# Patient Record
Sex: FEMALE | Race: WHITE | Employment: PART TIME | URBAN - METROPOLITAN AREA
[De-identification: names, ages, dates, MRNs, and addresses within clinical notes are randomized per-mention and may not be internally consistent; named-entity substitution may affect disease eponyms.]

---

## 2021-10-21 PROBLEM — G91.9 HYDROCEPHALUS (HCC): Status: ACTIVE | Noted: 2021-10-21

## 2021-10-21 PROBLEM — R42 DIZZINESS: Status: ACTIVE | Noted: 2021-10-21

## 2021-10-21 PROBLEM — Z78.9 VEGETARIAN DIET: Status: ACTIVE | Noted: 2021-10-21

## 2021-10-21 PROBLEM — R19.7 DIARRHEA: Status: ACTIVE | Noted: 2021-10-21

## 2021-10-21 PROBLEM — R23.8 SCALP IRRITATION: Status: ACTIVE | Noted: 2021-10-21

## 2022-02-07 ENCOUNTER — HOSPITAL ENCOUNTER (OUTPATIENT)
Dept: CT IMAGING | Age: 32
Discharge: HOME | End: 2022-02-07
Attending: PSYCHIATRY & NEUROLOGY
Payer: COMMERCIAL

## 2022-02-07 DIAGNOSIS — I67.1 BRAIN ANEURYSM: ICD-10-CM

## 2022-02-07 PROCEDURE — 70496 CT ANGIOGRAPHY HEAD: CPT

## 2022-02-07 PROCEDURE — 74011000636 HC RX REV CODE- 636: Performed by: PSYCHIATRY & NEUROLOGY

## 2022-02-07 PROCEDURE — 74011000258 HC RX REV CODE- 258: Performed by: PSYCHIATRY & NEUROLOGY

## 2022-02-07 RX ORDER — SODIUM CHLORIDE 0.9 % (FLUSH) 0.9 %
10 SYRINGE (ML) INJECTION
Status: COMPLETED | OUTPATIENT
Start: 2022-02-07 | End: 2022-02-07

## 2022-02-07 RX ADMIN — SODIUM CHLORIDE 100 ML: 9 INJECTION, SOLUTION INTRAVENOUS at 10:34

## 2022-02-07 RX ADMIN — IOPAMIDOL 100 ML: 755 INJECTION, SOLUTION INTRAVENOUS at 10:34

## 2022-02-07 RX ADMIN — Medication 10 ML: at 10:34

## 2022-02-08 ENCOUNTER — HOSPITAL ENCOUNTER (OUTPATIENT)
Dept: MRI IMAGING | Age: 32
Discharge: HOME | End: 2022-02-08
Attending: PSYCHIATRY & NEUROLOGY
Payer: COMMERCIAL

## 2022-02-08 DIAGNOSIS — G37.9 DEMYELINATING DISEASE (HCC): ICD-10-CM

## 2022-02-08 PROCEDURE — A9576 INJ PROHANCE MULTIPACK: HCPCS | Performed by: PSYCHIATRY & NEUROLOGY

## 2022-02-08 PROCEDURE — 74011250636 HC RX REV CODE- 250/636: Performed by: PSYCHIATRY & NEUROLOGY

## 2022-02-08 PROCEDURE — 70553 MRI BRAIN STEM W/O & W/DYE: CPT

## 2022-02-08 RX ORDER — SODIUM CHLORIDE 0.9 % (FLUSH) 0.9 %
10 SYRINGE (ML) INJECTION
Status: COMPLETED | OUTPATIENT
Start: 2022-02-08 | End: 2022-02-08

## 2022-02-08 RX ADMIN — Medication 10 ML: at 12:42

## 2022-02-08 RX ADMIN — GADOTERIDOL 13 ML: 279.3 INJECTION, SOLUTION INTRAVENOUS at 12:42

## 2022-03-18 PROBLEM — R42 DIZZINESS: Status: ACTIVE | Noted: 2021-10-21

## 2022-03-19 PROBLEM — G91.9 HYDROCEPHALUS (HCC): Status: ACTIVE | Noted: 2021-10-21

## 2022-03-19 PROBLEM — Z78.9 VEGETARIAN DIET: Status: ACTIVE | Noted: 2021-10-21

## 2022-03-19 PROBLEM — R19.7 DIARRHEA: Status: ACTIVE | Noted: 2021-10-21

## 2022-03-20 PROBLEM — R23.8 SCALP IRRITATION: Status: ACTIVE | Noted: 2021-10-21

## 2022-05-26 ENCOUNTER — APPOINTMENT (RX ONLY)
Dept: URBAN - METROPOLITAN AREA CLINIC 329 | Facility: CLINIC | Age: 32
Setting detail: DERMATOLOGY
End: 2022-05-26

## 2022-05-26 DIAGNOSIS — D22 MELANOCYTIC NEVI: ICD-10-CM

## 2022-05-26 DIAGNOSIS — L30.4 ERYTHEMA INTERTRIGO: ICD-10-CM | Status: STABLE

## 2022-05-26 PROBLEM — D48.5 NEOPLASM OF UNCERTAIN BEHAVIOR OF SKIN: Status: ACTIVE | Noted: 2022-05-26

## 2022-05-26 PROCEDURE — 99203 OFFICE O/P NEW LOW 30 MIN: CPT | Mod: 25

## 2022-05-26 PROCEDURE — ? COUNSELING

## 2022-05-26 PROCEDURE — 11302 SHAVE SKIN LESION 1.1-2.0 CM: CPT

## 2022-05-26 PROCEDURE — ? PRESCRIPTION

## 2022-05-26 PROCEDURE — ? SHAVE REMOVAL

## 2022-05-26 PROCEDURE — ? PRESCRIPTION MEDICATION MANAGEMENT

## 2022-05-26 RX ORDER — KETOCONAZOLE 20 MG/G
CREAM TOPICAL
Qty: 60 | Refills: 2 | Status: ERX | COMMUNITY
Start: 2022-05-26

## 2022-05-26 RX ADMIN — KETOCONAZOLE: 20 CREAM TOPICAL at 00:00

## 2022-05-26 ASSESSMENT — LOCATION ZONE DERM
LOCATION ZONE: TRUNK
LOCATION ZONE: AXILLAE
LOCATION ZONE: AXILLAE
LOCATION ZONE: LEG
LOCATION ZONE: TRUNK
LOCATION ZONE: LEG

## 2022-05-26 ASSESSMENT — LOCATION DETAILED DESCRIPTION DERM
LOCATION DETAILED: RIGHT AXILLARY VAULT
LOCATION DETAILED: LEFT SUPERIOR MEDIAL UPPER BACK
LOCATION DETAILED: LEFT SUPERIOR MEDIAL UPPER BACK
LOCATION DETAILED: LEFT AXILLARY VAULT
LOCATION DETAILED: RIGHT AXILLARY VAULT
LOCATION DETAILED: LEFT ANTERIOR PROXIMAL THIGH
LOCATION DETAILED: RIGHT ANTERIOR PROXIMAL THIGH
LOCATION DETAILED: LEFT RIB CAGE
LOCATION DETAILED: LEFT ANTERIOR PROXIMAL THIGH
LOCATION DETAILED: RIGHT ANTERIOR PROXIMAL THIGH
LOCATION DETAILED: RIGHT LATERAL BREAST 6-7:00 REGION
LOCATION DETAILED: LEFT AXILLARY VAULT

## 2022-05-26 ASSESSMENT — LOCATION SIMPLE DESCRIPTION DERM
LOCATION SIMPLE: RIGHT AXILLARY VAULT
LOCATION SIMPLE: LEFT AXILLARY VAULT
LOCATION SIMPLE: LEFT THIGH
LOCATION SIMPLE: LEFT AXILLARY VAULT
LOCATION SIMPLE: RIGHT THIGH
LOCATION SIMPLE: LEFT UPPER BACK
LOCATION SIMPLE: RIGHT THIGH
LOCATION SIMPLE: RIGHT BREAST
LOCATION SIMPLE: LEFT UPPER BACK
LOCATION SIMPLE: ABDOMEN
LOCATION SIMPLE: LEFT THIGH
LOCATION SIMPLE: RIGHT AXILLARY VAULT

## 2022-05-26 ASSESSMENT — BSA RASH: BSA RASH: 6

## 2022-05-26 ASSESSMENT — PAIN INTENSITY VAS: HOW INTENSE IS YOUR PAIN 0 BEING NO PAIN, 10 BEING THE MOST SEVERE PAIN POSSIBLE?: NO PAIN

## 2022-05-26 ASSESSMENT — SEVERITY ASSESSMENT: SEVERITY: MILD TO MODERATE

## 2022-05-26 NOTE — PROCEDURE: PRESCRIPTION MEDICATION MANAGEMENT
Detail Level: Zone
Initiate Treatment: Ketoconazole cream bid for three weeks \\nZeasorb AF powder as needed
Render In Strict Bullet Format?: No

## 2022-06-09 DIAGNOSIS — D22.9 NEVUS: Primary | ICD-10-CM

## 2022-06-09 DIAGNOSIS — Z86.19 HISTORY OF FUNGAL SKIN INFECTION: ICD-10-CM

## 2022-06-29 ENCOUNTER — TELEPHONE (OUTPATIENT)
Dept: FAMILY MEDICINE CLINIC | Facility: CLINIC | Age: 32
End: 2022-06-29

## 2022-06-29 NOTE — TELEPHONE ENCOUNTER
----- Message from Esperanza Rai sent at 6/29/2022 10:27 AM EDT -----  Subject: Message to Provider    QUESTIONS  Information for Provider? Pt would like to switch providers to Dr. Leroy Gabriel if Possible she would like to come in on Friday after 3 for   allergy testing.   ---------------------------------------------------------------------------  --------------  CALL BACK INFO  What is the best way for the office to contact you? OK to leave message on   voicemail  Preferred Call Back Phone Number? [phone]  ---------------------------------------------------------------------------  --------------  SCRIPT ANSWERS  Relationship to Patient?  Self

## 2023-02-24 ENCOUNTER — OFFICE VISIT (OUTPATIENT)
Dept: FAMILY MEDICINE CLINIC | Facility: CLINIC | Age: 33
End: 2023-02-24

## 2023-02-24 VITALS
OXYGEN SATURATION: 98 % | SYSTOLIC BLOOD PRESSURE: 82 MMHG | TEMPERATURE: 98.2 F | WEIGHT: 143 LBS | DIASTOLIC BLOOD PRESSURE: 52 MMHG | HEART RATE: 70 BPM | HEIGHT: 66 IN | BODY MASS INDEX: 22.98 KG/M2

## 2023-02-24 DIAGNOSIS — B37.9 CANDIDA INFECTION: ICD-10-CM

## 2023-02-24 DIAGNOSIS — H92.01 EAR PAIN, RIGHT: Primary | ICD-10-CM

## 2023-02-24 PROCEDURE — 99213 OFFICE O/P EST LOW 20 MIN: CPT | Performed by: FAMILY MEDICINE

## 2023-02-24 RX ORDER — AMOXICILLIN AND CLAVULANATE POTASSIUM 875; 125 MG/1; MG/1
1 TABLET, FILM COATED ORAL 2 TIMES DAILY
Qty: 20 TABLET | Refills: 0 | Status: SHIPPED | OUTPATIENT
Start: 2023-02-24 | End: 2023-03-06

## 2023-02-24 RX ORDER — FLUCONAZOLE 150 MG/1
150 TABLET ORAL ONCE
Qty: 1 TABLET | Refills: 0 | Status: SHIPPED | OUTPATIENT
Start: 2023-02-24 | End: 2023-02-24

## 2023-02-24 SDOH — ECONOMIC STABILITY: FOOD INSECURITY: WITHIN THE PAST 12 MONTHS, THE FOOD YOU BOUGHT JUST DIDN'T LAST AND YOU DIDN'T HAVE MONEY TO GET MORE.: NEVER TRUE

## 2023-02-24 SDOH — ECONOMIC STABILITY: INCOME INSECURITY: HOW HARD IS IT FOR YOU TO PAY FOR THE VERY BASICS LIKE FOOD, HOUSING, MEDICAL CARE, AND HEATING?: NOT HARD AT ALL

## 2023-02-24 SDOH — ECONOMIC STABILITY: HOUSING INSECURITY
IN THE LAST 12 MONTHS, WAS THERE A TIME WHEN YOU DID NOT HAVE A STEADY PLACE TO SLEEP OR SLEPT IN A SHELTER (INCLUDING NOW)?: NO

## 2023-02-24 SDOH — ECONOMIC STABILITY: FOOD INSECURITY: WITHIN THE PAST 12 MONTHS, YOU WORRIED THAT YOUR FOOD WOULD RUN OUT BEFORE YOU GOT MONEY TO BUY MORE.: NEVER TRUE

## 2023-02-24 ASSESSMENT — PATIENT HEALTH QUESTIONNAIRE - PHQ9
1. LITTLE INTEREST OR PLEASURE IN DOING THINGS: 0
SUM OF ALL RESPONSES TO PHQ QUESTIONS 1-9: 0
SUM OF ALL RESPONSES TO PHQ9 QUESTIONS 1 & 2: 0
2. FEELING DOWN, DEPRESSED OR HOPELESS: 0

## 2023-02-24 ASSESSMENT — ENCOUNTER SYMPTOMS
EYE PAIN: 1
COUGH: 0
SHORTNESS OF BREATH: 0

## 2023-02-24 NOTE — PROGRESS NOTES
Keyana Pope (: 1990) is a 29 y.o. female, established patient, here for evaluation of the following chief complaint(s):  Tinnitus (Right ear/ thumping 4 months )       ASSESSMENT/PLAN:  1. Ear pain, right  -     amoxicillin-clavulanate (AUGMENTIN) 875-125 MG per tablet; Take 1 tablet by mouth 2 times daily for 10 days, Disp-20 tablet, R-0Normal    Right ear is a little bit erythematous, will treat with Augmentin. If no improvement to her symptoms over the next 5 days will refer to ENT. Return if symptoms worsen or fail to improve. SUBJECTIVE/OBJECTIVE:  HPI  72-year-old female presents for right ear pain, ringing as well as thumping. She said she had something in her ear for the past 3 months off and on, none today. She is also had some ache in her ears and ringing as well. She said she used to get very frequent ear infections, had seen ENT in high school. Denies any problems with hearing, no fever, congestion, signs of infection. She does have seasonal allergies, is not currently taking anything. Review of Systems   Constitutional:  Negative for activity change, appetite change, fever and unexpected weight change. Eyes:  Positive for pain. Respiratory:  Negative for cough and shortness of breath. Cardiovascular:  Negative for chest pain and palpitations. Skin:  Negative for rash. Neurological:  Negative for dizziness and headaches. Psychiatric/Behavioral:  Negative for sleep disturbance. Vitals:    23 1031   BP: (!) 82/52   Pulse: 70   Temp: 98.2 °F (36.8 °C)   SpO2: 98%       Physical Exam  Vitals reviewed. Constitutional:       General: She is not in acute distress. Appearance: Normal appearance. She is not ill-appearing, toxic-appearing or diaphoretic. HENT:      Head: Normocephalic and atraumatic. Right Ear: There is no impacted cerumen. Tympanic membrane is erythematous.       Left Ear: Tympanic membrane, ear canal and external ear normal. There is no impacted cerumen. Nose: Nose normal. No congestion or rhinorrhea. Mouth/Throat:      Mouth: Mucous membranes are moist.      Pharynx: Oropharynx is clear. No oropharyngeal exudate or posterior oropharyngeal erythema. Eyes:      General:         Right eye: No discharge. Left eye: No discharge. Conjunctiva/sclera: Conjunctivae normal.      Pupils: Pupils are equal, round, and reactive to light. Cardiovascular:      Rate and Rhythm: Normal rate and regular rhythm. Heart sounds: Normal heart sounds. No murmur heard. No friction rub. No gallop. Pulmonary:      Effort: Pulmonary effort is normal. No respiratory distress. Breath sounds: Normal breath sounds. No wheezing, rhonchi or rales. Musculoskeletal:         General: No swelling. Normal range of motion. Cervical back: Normal range of motion. No rigidity. Lymphadenopathy:      Cervical: No cervical adenopathy. Skin:     General: Skin is warm. Findings: No rash. Neurological:      General: No focal deficit present. Mental Status: She is alert. Psychiatric:         Mood and Affect: Mood normal.          On this date, I spent 25 minutes reviewing previous notes, test results and face to face with the patient discussing the diagnosis and importance of compliance with the treatment plan as well as documenting on the day of the visit. An electronic signature was used to authenticate this note.   -- Sandra Lay MD

## 2023-03-06 ENCOUNTER — OFFICE VISIT (OUTPATIENT)
Dept: ORTHOPEDIC SURGERY | Age: 33
End: 2023-03-06
Payer: COMMERCIAL

## 2023-03-06 DIAGNOSIS — M19.079 ANKLE ARTHRITIS: ICD-10-CM

## 2023-03-06 DIAGNOSIS — G89.29 CHRONIC PAIN OF LEFT ANKLE: Primary | ICD-10-CM

## 2023-03-06 DIAGNOSIS — M25.572 CHRONIC PAIN OF LEFT ANKLE: Primary | ICD-10-CM

## 2023-03-06 DIAGNOSIS — T84.84XA PAINFUL ORTHOPAEDIC HARDWARE (HCC): ICD-10-CM

## 2023-03-06 PROCEDURE — 99214 OFFICE O/P EST MOD 30 MIN: CPT | Performed by: ORTHOPAEDIC SURGERY

## 2023-03-06 NOTE — PROGRESS NOTES
Name: Mickey Springer  YOB: 1990  Gender: female  MRN: [id number]    Summary:   Left posttraumatic ankle arthritis with hardware pain       CC: Ankle Pain (Left ankle pain will xray today )       HPI: Mickey Springer is a 29 y.o. female who presents with Ankle Pain (Left ankle pain will xray today )  . This patient presents the office today with progressively worsening left ankle pain and stiffness. She had a trimalleolar ankle fracture dislocation that was treated about 10 years ago in [facility]. History was obtained by Patient     ROS/Meds/PSH/PMH/FH/SH: I personally reviewed the patients standard intake form. Below are the pertinents    Tobacco:  reports that she has never smoked. She has never used smokeless tobacco.  Diabetes: None      Physical Examination:    Exam of the left foot and ankle shows well-healed surgical incisions. She has limited plantarflexion and dorsiflexion with good subtalar joint range of motion. She has tenderness to palpation over the lateral hardware as well as over the medial hardware. She has palpable pulses and intact sensation. Imaging:   I independently interpreted XR taken today  Left ankle XR: AP, Lateral, Oblique views     ICD-10-CM    1. Chronic pain of left ankle  M25.572 XR ANKLE LEFT (MIN 3 VIEWS)    G89.29       2. Painful orthopaedic hardware ([address]  T84.84XA       3.  Ankle arthritis  M19.079          Report: AP, lateral, oblique x-ray of the left ankle demonstrates retained hardware and moderate posttraumatic arthritis    Impression: Pain hardware and moderate posttraumatic arthritis   Kiara Hurtado III, MD           Assessment:   Left ankle hardware pain with moderate posttraumatic arthritis    Treatment Plan:   4 This is a chronic illness/condition with exacerbation and progression  Treatment at this time: Elective major surgery with procedural risk factors  Studies ordered: NO XR needed @ Next Visit    Weight-bearing status: WBAT        Return to work/work restrictions: none  No medications given    left Ankle arthroscopic  debridement and micro-fracture and removal of hardware    Outpatient - 1 hour,  scope equipment, arthroscopic picks, 2.7 mm 30 degree scope, arthroscopic graspers, 3.5 mm shaver, noninvasive ankle distractor, C arm, trauma screwdriver set    Anesthesia - Choice       Risks and benefits of OCD debridement with micro-fracture outlined, salvador. progressive arthritis associated w/ the ocd, stiffness, infection and no pain relief or potentially worsening of the existing pain. Potential one year recovery outlined. I outlined time of immobilization. We discussed the 80-85% long term success rate of this procedure but that some patients require additional surgery to replace the cartilage damage which may require osteotomy if this surgery does not offer adequate pain relief. The patient accepts and would like to proceed with surgery. We discussed the findings today. At her young age she would like to avoid ankle arthrodesis or ankle arthroplasty. We did discuss use of ankle arthroscopy in an effort to buy some time by cleaning the out the ankle joint to offer some pain relief and increase in function. We discussed this might be necessary to repeat down the road as well. We also discussed removal of all hardware from the ankle as this will make reconstructive procedures easier later and she is already having hardware pain over these areas anyway. We discussed the risk of incomplete pain relief as well as prolonged recovery.

## 2023-03-23 ENCOUNTER — TELEPHONE (OUTPATIENT)
Dept: FAMILY MEDICINE CLINIC | Facility: CLINIC | Age: 33
End: 2023-03-23

## 2023-03-23 DIAGNOSIS — H92.01 EAR PAIN, RIGHT: Primary | ICD-10-CM

## 2023-03-23 NOTE — TELEPHONE ENCOUNTER
Pt called to inform that her symptoms have not gotten better since her visit 2/24/23, and is asking for a referral to ENT.  Patients call back number is [phone]

## 2023-04-04 ENCOUNTER — TELEPHONE (OUTPATIENT)
Dept: ORTHOPEDIC SURGERY | Age: 33
End: 2023-04-04

## 2023-04-05 ENCOUNTER — TELEPHONE (OUTPATIENT)
Dept: ORTHOPEDIC SURGERY | Age: 33
End: 2023-04-05

## 2023-04-05 NOTE — TELEPHONE ENCOUNTER
She is returning a call again. I told her it would probably be tomorrow before she gets a return call.

## 2023-04-06 ENCOUNTER — OFFICE VISIT (OUTPATIENT)
Dept: ENT CLINIC | Age: 33
End: 2023-04-06
Payer: COMMERCIAL

## 2023-04-06 VITALS — RESPIRATION RATE: 16 BRPM | BODY MASS INDEX: 23.3 KG/M2 | WEIGHT: 145 LBS | HEIGHT: 66 IN

## 2023-04-06 DIAGNOSIS — R04.0 EPISTAXIS: ICD-10-CM

## 2023-04-06 DIAGNOSIS — M26.621 ARTHRALGIA OF RIGHT TEMPOROMANDIBULAR JOINT: Primary | ICD-10-CM

## 2023-04-06 PROCEDURE — 31231 NASAL ENDOSCOPY DX: CPT | Performed by: OTOLARYNGOLOGY

## 2023-04-06 PROCEDURE — 99204 OFFICE O/P NEW MOD 45 MIN: CPT | Performed by: OTOLARYNGOLOGY

## 2023-04-06 NOTE — TELEPHONE ENCOUNTER
Patient would like to talk with a couple of patients who have had this surgery and also needs Jesus Rincon for the cost of the surgery. I told her I would get back to her tomorrow afternoon.

## 2023-04-06 NOTE — PROGRESS NOTES
on file   Social History Narrative    Not on file     Social Determinants of Health     Financial Resource Strain: Low Risk     Difficulty of Paying Living Expenses: Not hard at all   Food Insecurity: No Food Insecurity    Worried About [address] in the Last Year: Never true    [facility] in the Last Year: Never true   Transportation Needs: Unknown    Lack of Transportation (Medical): Not on file    Lack of Transportation (Non-Medical): No   Physical Activity: Not on file   Stress: Not on file   Social Connections: Not on file   Intimate Partner Violence: Not on file   Housing Stability: Unknown    Unable to Pay for Housing in the Last Year: Not on file    Number of Places Lived in the Last Year: Not on file    Unstable Housing in the Last Year: No        Allergies   Allergen Reactions    Sulfa Antibiotics Anaphylaxis        ROS:  The patient was asked specifically about the following. General: Fever, chills, night sweats, unexplained weight loss or weight gain  Eyes: Blurry vision, double vision, floaters, loss or decrease of peripheral vision.    Ears: Difficulty hearing, ringing or buzzing in the ears, ear fullness, frequent ear infections, ear pain or drainage, hearing aid  Nose/Face: Drainage, frequent nosebleeds, sinus pain, pressure or fullness, difficulty breathing through the nose, facial pain, swelling or masses  Mouth: Sores in mouth, tongue soreness, bleeding gums, wears dentures, growths in mouth  Throat: Sore throat, hoarseness, difficulty swallowing, lump in throat, sore throat frequency  Respiratory: Difficulty breathing, frequent cough, productive cough, wheezing, recent abnormal chest X-RAY  Cardiovascular: Blocked arteries, chest pain, shortness of breath, abnormal heart beat, pacemaker  Digestive: Frequent indigestion, burning in throat,chest or stomach after a meal, burning that wakes you in the night, abdominal pain  Neuropsychiatric: Headaches, seizures, facial numbness or

## 2023-04-07 NOTE — TELEPHONE ENCOUNTER
Called and left CPT codes 18285 and 91188 on patient's voice mail [facility].

## 2024-02-14 ENCOUNTER — OFFICE VISIT (OUTPATIENT)
Dept: PRIMARY CARE CLINIC | Facility: CLINIC | Age: 34
End: 2024-02-14
Payer: COMMERCIAL

## 2024-02-14 VITALS
BODY MASS INDEX: 22.02 KG/M2 | SYSTOLIC BLOOD PRESSURE: 104 MMHG | DIASTOLIC BLOOD PRESSURE: 64 MMHG | TEMPERATURE: 97.8 F | HEART RATE: 89 BPM | HEIGHT: 66 IN | WEIGHT: 137 LBS | OXYGEN SATURATION: 99 %

## 2024-02-14 DIAGNOSIS — F41.9 ANXIETY: Primary | ICD-10-CM

## 2024-02-14 PROCEDURE — 99203 OFFICE O/P NEW LOW 30 MIN: CPT | Performed by: NURSE PRACTITIONER

## 2024-02-14 RX ORDER — BUPROPION HYDROCHLORIDE 150 MG/1
150 TABLET ORAL EVERY MORNING
Qty: 90 TABLET | Refills: 1 | Status: SHIPPED | OUTPATIENT
Start: 2024-02-14

## 2024-02-14 RX ORDER — BUTYROSPERMUM PARKII(SHEA BUTTER), SIMMONDSIA CHINENSIS (JOJOBA) SEED OIL, ALOE BARBADENSIS LEAF EXTRACT .01; 1; 3.5 G/100G; G/100G; G/100G
LIQUID TOPICAL
COMMUNITY

## 2024-02-14 NOTE — ASSESSMENT & PLAN NOTE
Anxiety/Depression  New Onset.  Labs: at least annually. No labs collected today.  Non-laboratory testing: None indicated today.  Medication: Given Rx for Bupropion 150 mg PO QD.  Discussed warning S&S r/t medication usage. Discussed SE, AR, AE.  Encourage appropriate rest and fluid intake  Mental Health: Promote sleep hygiene (Establish a regular sleep schedule; Cut down time in bed; Make the bedroom comfortable; Relax at bedtime; Perform measures to make you tired at bedtime). Do not exercise within 90 minutes of bedtime. No overstimulating activities just before bed. Avoid caffeine after lunchtime. Do not eat a heavy meal within 2 hours of bedimte. Avoid excessive fluids immediately before bedtime. Do not use alcohol to induce sleep. Do not turn on light when getting up to use the bathroom.  Encourage lifestyle changes, including healthy eating, exercise, limiting alcohol/tobacco use, and stress reducers.  Encourage lifestyle changes, including healthy eating, exercise, limiting alcohol/tobacco use, and stress reducers.  Monitor for SI or HI. Seek emergent care if symptoms develop.  Discussed alternative therapies such as keeping a diary, CBT, psychotherapy, etc.  F/u in  30 days after starting medication  for re-evaluation, unless an earlier f/u is required. If improvement is noted, we will provide additional refills.

## 2024-02-14 NOTE — PROGRESS NOTES
ambulation/gait, numbness, tingling, weakness, syncope, chest pain (with or without radiation), left arm pain, jaw pain, changes in hearing (loss), fever, unexplained sweating, malaise/fatigue, difficulty swallowing, mental changes (confusion, AMS), lightheadedness/dizziness, difficulty breathing, or shortness of breath.    I have reviewed the patient's medication list, past medical, family, social, and surgical history in detail and updated the patient record appropriately.    I have reviewed the patient's vital signs and discussed risks associated with any abnormal vital signs (during visit and following this visit) as well as appropriate parameters with the patient. Patient verbalized understanding. Patient agreed to seek emergent care if vital signs are above or below the parameters discussed.     @[facility]@

## 2024-02-29 ENCOUNTER — NURSE ONLY (OUTPATIENT)
Dept: PRIMARY CARE CLINIC | Facility: CLINIC | Age: 34
End: 2024-02-29

## 2024-02-29 DIAGNOSIS — R79.9 ABNORMAL BLOOD CHEMISTRY: ICD-10-CM

## 2024-02-29 DIAGNOSIS — Z13.228 SCREENING FOR METABOLIC DISORDER: ICD-10-CM

## 2024-02-29 DIAGNOSIS — Z13.21 ENCOUNTER FOR VITAMIN DEFICIENCY SCREENING: ICD-10-CM

## 2024-02-29 DIAGNOSIS — R53.83 FATIGUE, UNSPECIFIED TYPE: Primary | ICD-10-CM

## 2024-02-29 DIAGNOSIS — E78.5 HYPERLIPIDEMIA, UNSPECIFIED HYPERLIPIDEMIA TYPE: ICD-10-CM

## 2024-02-29 DIAGNOSIS — R73.01 IFG (IMPAIRED FASTING GLUCOSE): ICD-10-CM

## 2024-02-29 DIAGNOSIS — R94.6 NONSPECIFIC ABNORMAL RESULTS OF THYROID FUNCTION STUDY: ICD-10-CM

## 2024-02-29 DIAGNOSIS — R53.83 FATIGUE, UNSPECIFIED TYPE: ICD-10-CM

## 2024-02-29 LAB
25(OH)D3 SERPL-MCNC: 21.2 NG/ML (ref 30–100)
ESTRADIOL SERPL-MCNC: 51.26 PG/ML
PROGEST SERPL-MCNC: 0.82 NG/ML

## 2024-03-01 LAB
Lab: NORMAL
REFERENCE LAB: NORMAL

## 2024-03-01 NOTE — RESULT ENCOUNTER NOTE
Please let the patient know:    Vitamin D is low.  A weekly prescription was sent in during the visit.  I recommend starting that soon as possible.  Estradiol and progesterone are completed and will be discussed with her by ordering provider, nutritionist.    [employer] message sent as well    Explanatory note: Be assured that the information provided to create your medical record comes from your provider. The written transcription portion of this note is prepared electronically by voice-recognition software. At times, there may be some errors in capitalization, punctuation, tense, or context that are inherent in the system, but your provider reviews the note for content and to ensure that the note contains appropriate information for your continuing care.

## 2024-03-02 LAB — DHEA-S SERPL-MCNC: 616 UG/DL (ref 84.8–378)

## 2024-03-04 LAB
TESTOST FREE SERPL-MCNC: 1.6 PG/ML (ref 0–4.2)
TESTOST SERPL-MCNC: 45 NG/DL (ref 8–60)

## 2024-03-11 LAB
Lab: NORMAL
Lab: NORMAL
REFERENCE LAB: NORMAL

## 2024-04-11 RX ORDER — BUPROPION HYDROCHLORIDE 150 MG/1
150 TABLET ORAL EVERY MORNING
Qty: 90 TABLET | Refills: 2 | OUTPATIENT
Start: 2024-04-11

## 2024-05-03 ENCOUNTER — NURSE ONLY (OUTPATIENT)
Dept: PRIMARY CARE CLINIC | Facility: CLINIC | Age: 34
End: 2024-05-03

## 2024-05-03 DIAGNOSIS — Z13.228 SCREENING FOR METABOLIC DISORDER: ICD-10-CM

## 2024-05-03 DIAGNOSIS — E55.9 VITAMIN D DEFICIENCY DISEASE: ICD-10-CM

## 2024-05-03 DIAGNOSIS — R53.83 FATIGUE, UNSPECIFIED TYPE: Primary | ICD-10-CM

## 2024-05-03 DIAGNOSIS — E34.8 ESTRADIOL DEFICIENCY: ICD-10-CM

## 2024-05-03 DIAGNOSIS — R53.83 FATIGUE, UNSPECIFIED TYPE: ICD-10-CM

## 2024-05-03 LAB
25(OH)D3 SERPL-MCNC: 66.3 NG/ML (ref 30–100)
ALBUMIN SERPL-MCNC: 4.7 G/DL (ref 3.5–5)
ALBUMIN/GLOB SERPL: 1.5 (ref 1–1.9)
ALP SERPL-CCNC: 54 U/L (ref 35–104)
ALT SERPL-CCNC: 40 U/L (ref 12–65)
ANION GAP SERPL CALC-SCNC: 10 MMOL/L (ref 9–18)
AST SERPL-CCNC: 27 U/L (ref 15–37)
BASOPHILS # BLD: 0.1 K/UL (ref 0–0.2)
BASOPHILS NFR BLD: 1 % (ref 0–2)
BILIRUB SERPL-MCNC: 0.7 MG/DL (ref 0–1.2)
BUN SERPL-MCNC: 11 MG/DL (ref 6–23)
CALCIUM SERPL-MCNC: 9.8 MG/DL (ref 8.8–10.2)
CHLORIDE SERPL-SCNC: 103 MMOL/L (ref 98–107)
CHOLEST SERPL-MCNC: 254 MG/DL (ref 0–200)
CO2 SERPL-SCNC: 26 MMOL/L (ref 20–28)
CREAT SERPL-MCNC: 0.77 MG/DL (ref 0.6–1.1)
DIFFERENTIAL METHOD BLD: ABNORMAL
EOSINOPHIL # BLD: 0.1 K/UL (ref 0–0.8)
EOSINOPHIL NFR BLD: 2 % (ref 0.5–7.8)
ERYTHROCYTE [DISTWIDTH] IN BLOOD BY AUTOMATED COUNT: 13.1 % (ref 11.9–14.6)
EST. AVERAGE GLUCOSE BLD GHB EST-MCNC: 109 MG/DL
ESTRADIOL SERPL-MCNC: 49.3 PG/ML
GLOBULIN SER CALC-MCNC: 3.1 G/DL (ref 2.3–3.5)
GLUCOSE SERPL-MCNC: 96 MG/DL (ref 70–99)
HBA1C MFR BLD: 5.4 % (ref 0–5.6)
HCT VFR BLD AUTO: 43.8 % (ref 35.8–46.3)
HDLC SERPL-MCNC: 76 MG/DL (ref 40–60)
HDLC SERPL: 3.3 (ref 0–5)
HGB BLD-MCNC: 14.1 G/DL (ref 11.7–15.4)
IMM GRANULOCYTES # BLD AUTO: 0 K/UL (ref 0–0.5)
IMM GRANULOCYTES NFR BLD AUTO: 0 % (ref 0–5)
LDLC SERPL CALC-MCNC: 168 MG/DL (ref 0–100)
LYMPHOCYTES # BLD: 2.2 K/UL (ref 0.5–4.6)
LYMPHOCYTES NFR BLD: 45 % (ref 13–44)
MCH RBC QN AUTO: 29.4 PG (ref 26.1–32.9)
MCHC RBC AUTO-ENTMCNC: 32.2 G/DL (ref 31.4–35)
MCV RBC AUTO: 91.3 FL (ref 82–102)
MONOCYTES # BLD: 0.3 K/UL (ref 0.1–1.3)
MONOCYTES NFR BLD: 6 % (ref 4–12)
NEUTS SEG # BLD: 2.3 K/UL (ref 1.7–8.2)
NEUTS SEG NFR BLD: 46 % (ref 43–78)
NRBC # BLD: 0 K/UL (ref 0–0.2)
PLATELET # BLD AUTO: 243 K/UL (ref 150–450)
PMV BLD AUTO: 11.9 FL (ref 9.4–12.3)
POTASSIUM SERPL-SCNC: 4.4 MMOL/L (ref 3.5–5.1)
PROGEST SERPL-MCNC: 0.42 NG/ML
PROT SERPL-MCNC: 7.8 G/DL (ref 6.3–8.2)
RBC # BLD AUTO: 4.8 M/UL (ref 4.05–5.2)
SODIUM SERPL-SCNC: 139 MMOL/L (ref 136–145)
T4 FREE SERPL-MCNC: 1.4 NG/DL (ref 0.9–1.7)
TRIGL SERPL-MCNC: 50 MG/DL (ref 0–150)
TSH, 3RD GENERATION: 1.63 UIU/ML (ref 0.27–4.2)
VIT B12 SERPL-MCNC: 449 PG/ML (ref 193–986)
VLDLC SERPL CALC-MCNC: 10 MG/DL (ref 6–23)
WBC # BLD AUTO: 4.9 K/UL (ref 4.3–11.1)

## 2024-05-06 NOTE — RESULT ENCOUNTER NOTE
Please let the patient know:    Vitamin D is normal  Estradiol is normal  Progesterone is normal    1 lab still pending    [employer] message sent as well    Explanatory note: Be assured that the information provided to create your medical record comes from your provider. The written transcription portion of this note is prepared electronically by voice-recognition software. At times, there may be some errors in capitalization, punctuation, tense, or context that are inherent in the system, but your provider reviews the note for content and to ensure that the note contains appropriate information for your continuing care.

## 2024-05-09 ENCOUNTER — TELEPHONE (OUTPATIENT)
Dept: PRIMARY CARE CLINIC | Facility: CLINIC | Age: 34
End: 2024-05-09

## 2024-05-09 NOTE — TELEPHONE ENCOUNTER
----- Message from SARABJIT Green NP sent at 5/9/2024 11:11 AM EDT -----  Regarding: FW: Referral for xrays  Contact: [phone]  VV please  ----- Message -----  From: Elva Crawford  Sent: 5/9/2024   7:40 AM EDT  To: [facility] Staff  Subject: Referral for xrays                               Good morning!    Could I get a referral for a spine specialist? Or someone else you’d recommend? I have been having SI joint issues on and off for the past few years but it’s recently gotten so bad some days I can’t walk. The pain is referring to the posterior side as well and shooting down my leg. I’m hoping to get X-rays or an MRI if possible as soon as possible. Thank you so much!

## 2024-05-12 LAB — 17OH-PREG SERPL-MCNC: 107 NG/DL

## 2024-05-21 SDOH — ECONOMIC STABILITY: FOOD INSECURITY: WITHIN THE PAST 12 MONTHS, THE FOOD YOU BOUGHT JUST DIDN'T LAST AND YOU DIDN'T HAVE MONEY TO GET MORE.: NEVER TRUE

## 2024-05-21 SDOH — ECONOMIC STABILITY: INCOME INSECURITY: HOW HARD IS IT FOR YOU TO PAY FOR THE VERY BASICS LIKE FOOD, HOUSING, MEDICAL CARE, AND HEATING?: NOT VERY HARD

## 2024-05-21 SDOH — ECONOMIC STABILITY: TRANSPORTATION INSECURITY
IN THE PAST 12 MONTHS, HAS LACK OF TRANSPORTATION KEPT YOU FROM MEETINGS, WORK, OR FROM GETTING THINGS NEEDED FOR DAILY LIVING?: NO

## 2024-05-21 SDOH — ECONOMIC STABILITY: FOOD INSECURITY: WITHIN THE PAST 12 MONTHS, YOU WORRIED THAT YOUR FOOD WOULD RUN OUT BEFORE YOU GOT MONEY TO BUY MORE.: NEVER TRUE

## 2024-05-22 ENCOUNTER — TELEMEDICINE (OUTPATIENT)
Dept: PRIMARY CARE CLINIC | Facility: CLINIC | Age: 34
End: 2024-05-22
Payer: COMMERCIAL

## 2024-05-22 DIAGNOSIS — M54.42 CHRONIC BILATERAL LOW BACK PAIN WITH BILATERAL SCIATICA: Primary | ICD-10-CM

## 2024-05-22 DIAGNOSIS — M54.41 CHRONIC BILATERAL LOW BACK PAIN WITH BILATERAL SCIATICA: Primary | ICD-10-CM

## 2024-05-22 DIAGNOSIS — G89.29 CHRONIC BILATERAL LOW BACK PAIN WITH BILATERAL SCIATICA: Primary | ICD-10-CM

## 2024-05-22 PROCEDURE — 99213 OFFICE O/P EST LOW 20 MIN: CPT | Performed by: NURSE PRACTITIONER

## 2024-05-22 ASSESSMENT — ENCOUNTER SYMPTOMS
ABDOMINAL PAIN: 0
BOWEL INCONTINENCE: 0
BACK PAIN: 1

## 2024-05-22 NOTE — ASSESSMENT & PLAN NOTE
Stable. Chronic  XR ordered  MRI ordered  Referral to Ortho (patient preference for [facility] in [place])    Plan:  Pain Management:  Symptom Relief: Provide symptomatic relief for low back pain through a combination of pharmacological and non-pharmacological interventions, including analgesic medications (e.g., acetaminophen, NSAIDs), muscle relaxants, topical analgesics, and heat or cold therapy.  Medication Management: Prescribe medications as needed to address specific symptoms and underlying pathology, taking into account potential side effects, contraindications, and individual patient factors.  Physical Therapy:  Exercise Therapy: Refer the patient to physical therapy for targeted exercise programs aimed at improving core strength, flexibility, posture, and body mechanics, as well as reducing pain and preventing recurrence of low back pain.  Manual Therapy: Consider manual therapy techniques such as spinal manipulation, mobilization, massage therapy, or traction to improve spinal alignment, joint mobility, and soft tissue flexibility.  Education and Self-Management:  Patient Education: Provide education to the patient about the nature of low back pain, common causes and risk factors, prognosis, self-care strategies, ergonomic principles, and strategies for preventing recurrence.  Self-Management Strategies: Teach the patient self-management techniques, including proper lifting techniques, posture correction, relaxation techniques, stress management, activity modification, and home exercise programs.  Psychosocial Support:  Cognitive-Behavioral Therapy (CBT): Consider referral to a psychologist or counselor for CBT or other psychological interventions to address maladaptive beliefs, coping strategies, and emotional distress associated with low back pain.  Supportive Counseling: Offer supportive counseling, motivational interviewing, or mindfulness-based

## 2024-05-22 NOTE — PROGRESS NOTES
2024    TELEHEALTH EVALUATION -- Audio/Visual    1. LBP - Started in . No known recent injury. Reports possible injury years ago after falling from a rope swing. Has been working with PT for about 1-2 years. No daily meds. Has taken NSAIDs in the past for discomfort as well.    \"Could I get a referral for a spine specialist? Or someone else you'd recommend? I have been having SI joint issues on and off for the past few years but it's recently gotten so bad some days I can't walk. The pain is referring to the posterior side as well and shooting down my leg. I'm hoping to get X-rays or an MRI if possible as soon as possible.\"    Back Pain  This is a chronic problem. The current episode started more than 1 year ago. The problem occurs daily. The problem has been waxing and waning since onset. The pain is present in the lumbar spine and sacro-iliac. The quality of the pain is described as aching and shooting. The pain radiates to the right foot and left foot. The symptoms are aggravated by bending, lying down, position, sitting, standing and twisting. Pertinent negatives include no abdominal pain, bladder incontinence, bowel incontinence, chest pain, dysuria, fever, headaches, leg pain, numbness, paresis, paresthesias, pelvic pain, perianal numbness, tingling, weakness or weight loss. She has tried analgesics, bed rest, home exercises, heat, ice, muscle relaxant, walking, NSAIDs and chiropractic manipulation for the symptoms. The treatment provided mild relief.         Elva Crawford (:  1990) has requested an audio/video evaluation for the following concern(s):    No chief complaint on file.      Review of Systems   Constitutional:  Negative for fever and weight loss.   Cardiovascular:  Negative for chest pain.   Gastrointestinal:  Negative for abdominal pain and bowel incontinence.   Genitourinary:  Negative for bladder incontinence, dysuria and pelvic pain.   Musculoskeletal:  Positive for back

## 2024-05-24 ENCOUNTER — HOSPITAL ENCOUNTER (OUTPATIENT)
Dept: GENERAL RADIOLOGY | Age: 34
Discharge: HOME | End: 2024-05-24
Payer: COMMERCIAL

## 2024-05-24 DIAGNOSIS — M54.41 CHRONIC BILATERAL LOW BACK PAIN WITH BILATERAL SCIATICA: ICD-10-CM

## 2024-05-24 DIAGNOSIS — M54.42 CHRONIC BILATERAL LOW BACK PAIN WITH BILATERAL SCIATICA: ICD-10-CM

## 2024-05-24 DIAGNOSIS — G89.29 CHRONIC BILATERAL LOW BACK PAIN WITH BILATERAL SCIATICA: ICD-10-CM

## 2024-05-24 PROCEDURE — 72110 X-RAY EXAM L-2 SPINE 4/>VWS: CPT

## 2024-05-27 NOTE — RESULT ENCOUNTER NOTE
Please let the patient know:    Recent lumbar XR shows:    IMPRESSION:  No acute fracture or malalignment of the lumbar spine.    [employer] message sent as well    Explanatory note: Be assured that the information provided to create your medical record comes from your provider. The written transcription portion of this note is prepared electronically by voice-recognition software. At times, there may be some errors in capitalization, punctuation, tense, or context that are inherent in the system, but your provider reviews the note for content and to ensure that the note contains appropriate information for your continuing care.

## 2024-06-10 DIAGNOSIS — F41.9 ANXIETY: Primary | ICD-10-CM

## 2024-06-10 RX ORDER — BUPROPION HYDROCHLORIDE 300 MG/1
300 TABLET ORAL EVERY MORNING
Qty: 90 TABLET | Refills: 1 | Status: SHIPPED | OUTPATIENT
Start: 2024-06-10

## 2024-06-10 NOTE — ASSESSMENT & PLAN NOTE
06/10/2024  Recv'd request to increase dosing. Bupropion 300 mg sent.     02/14/2024  Anxiety/Depression    New Onset.  Labs: at least annually. No labs collected today.  Non-laboratory testing: None indicated today.  Medication: Given Rx for Bupropion 150 mg PO QD.  Discussed warning S&S r/t medication usage. Discussed SE, AR, AE.  Encourage appropriate rest and fluid intake  Mental Health: Promote sleep hygiene (Establish a regular sleep schedule; Cut down time in bed; Make the bedroom comfortable; Relax at bedtime; Perform measures to make you tired at bedtime). Do not exercise within 90 minutes of bedtime. No overstimulating activities just before bed. Avoid caffeine after lunchtime. Do not eat a heavy meal within 2 hours of bedimte. Avoid excessive fluids immediately before bedtime. Do not use alcohol to induce sleep. Do not turn on light when getting up to use the bathroom.  Encourage lifestyle changes, including healthy eating, exercise, limiting alcohol/tobacco use, and stress reducers.  Encourage lifestyle changes, including healthy eating, exercise, limiting alcohol/tobacco use, and stress reducers.  Monitor for SI or HI. Seek emergent care if symptoms develop.  Discussed alternative therapies such as keeping a diary, CBT, psychotherapy, etc.  F/u in  30 days after starting medication  for re-evaluation, unless an earlier f/u is required. If improvement is noted, we will provide additional refills.

## 2024-06-19 ENCOUNTER — TELEMEDICINE (OUTPATIENT)
Dept: PRIMARY CARE CLINIC | Facility: CLINIC | Age: 34
End: 2024-06-19
Payer: COMMERCIAL

## 2024-06-19 DIAGNOSIS — Z79.899 ENCOUNTER FOR LONG-TERM (CURRENT) USE OF HIGH-RISK MEDICATION: ICD-10-CM

## 2024-06-19 DIAGNOSIS — Z51.81 ENCOUNTER FOR THERAPEUTIC DRUG MONITORING: ICD-10-CM

## 2024-06-19 DIAGNOSIS — F90.2 ATTENTION DEFICIT HYPERACTIVITY DISORDER (ADHD), COMBINED TYPE: Primary | ICD-10-CM

## 2024-06-19 DIAGNOSIS — Z02.83 ENCOUNTER FOR DRUG SCREENING: ICD-10-CM

## 2024-06-19 DIAGNOSIS — F90.9 ATTENTION DEFICIT HYPERACTIVITY DISORDER (ADHD), UNSPECIFIED ADHD TYPE: ICD-10-CM

## 2024-06-19 DIAGNOSIS — Z13.9 SCREENING FOR UNSPECIFIED CONDITION: ICD-10-CM

## 2024-06-19 PROCEDURE — 99213 OFFICE O/P EST LOW 20 MIN: CPT | Performed by: NURSE PRACTITIONER

## 2024-06-19 RX ORDER — DEXTROAMPHETAMINE SACCHARATE, AMPHETAMINE ASPARTATE, DEXTROAMPHETAMINE SULFATE AND AMPHETAMINE SULFATE 1.25; 1.25; 1.25; 1.25 MG/1; MG/1; MG/1; MG/1
5 TABLET ORAL DAILY
Qty: 30 TABLET | Refills: 0 | Status: SHIPPED | OUTPATIENT
Start: 2024-06-19 | End: 2024-07-19

## 2024-06-19 ASSESSMENT — ENCOUNTER SYMPTOMS
RESPIRATORY NEGATIVE: 1
CONSTIPATION: 0
SHORTNESS OF BREATH: 0
VOMITING: 0
DIARRHEA: 0
CHEST TIGHTNESS: 0
EYES NEGATIVE: 1
ALLERGIC/IMMUNOLOGIC NEGATIVE: 1
NAUSEA: 0
ABDOMINAL PAIN: 0

## 2024-06-19 NOTE — ASSESSMENT & PLAN NOTE
Start Adderall 5 mg PO QD    CS Use  New Onset..  Stable..  CSA: Completed today.  UDS: Completed today.  Medication: See above - 30 day refill given  Discussed warning S&S r/t medication usage. Discussed SE, AR, AE.  F/u in 30 days for re-evaluation, unless an earlier f/u is required. If improvement is noted, we will provide additional refills.  Referral:  Not performed .    ADD/ADHD  Plan:  Psychosocial Interventions:  Psychoeducation: Educate the patient and family about ADHD, its symptoms, and management strategies.  Behavioral Therapy: Implement behavior modification techniques to address specific symptoms, improve organization, and enhance coping skills.  Parent Training: Provide parents with strategies to manage the patient's behavior effectively at home.  Academic/Work Accommodations: Collaborate with schools or employers to implement accommodations such as extended time for tasks, preferential seating, or breaks as needed.  Pharmacological Interventions:  Medication Trial: Consider initiating pharmacotherapy based on the severity of symptoms and functional impairment. Options include stimulant medications (e.g., methylphenidate, amphetamine derivatives) or non-stimulant medications (e.g., atomoxetine, guanfacine).  Start with low doses and titrate gradually while monitoring for efficacy and side effects.  Educate the patient and family about medication effects, adherence, and potential risks.  Regular Follow-up:  Schedule regular follow-up appointments to monitor treatment response, adjust interventions as necessary, and address any concerns or challenges.  Collaborate with other healthcare providers, educators, and caregivers involved in the patient's care to ensure a comprehensive approach.  Supportive Services:  Referral to Mental Health Professional: Offer ongoing counseling or therapy to address comorbid conditions (e.g., anxiety, depression) or psychosocial stressors.  Support Groups: Connect the

## 2024-06-19 NOTE — PROGRESS NOTES
.  
when appropriate.   The patient was located at Home: [address] [facility] [address]  Provider was located at Facility (Appt Dept): [address] [place] [address] [place] [address]  Confirm you are appropriately licensed, registered, or certified to deliver care in the state where the patient is located as indicated above. If you are not or unsure, please re-schedule the visit: Yes, I confirm.       Total time spent on this encounter:  15 min    --SARABJIT Green NP on 6/19/2024 at 2:59 PM    An electronic signature was used to authenticate this note.    ADDITIONAL EDUCATION    Last 7 days of labs:    No visits with results within 1 Week(s) from this visit.   Latest known visit with results is:   Orders Only on 05/03/2024   Component Date Value Ref Range Status    Vitamin B-12 05/03/2024 449  193 - 986 pg/mL Final    WBC 05/03/2024 4.9  4.3 - 11.1 K/uL Final    RBC 05/03/2024 4.80  4.05 - 5.2 M/uL Final    Hemoglobin 05/03/2024 14.1  11.7 - 15.4 g/dL Final    Hematocrit 05/03/2024 43.8  35.8 - 46.3 % Final    MCV 05/03/2024 91.3  82 - 102 FL Final    MCH 05/03/2024 29.4  26.1 - 32.9 PG Final    MCHC 05/03/2024 32.2  31.4 - 35.0 g/dL Final    RDW 05/03/2024 13.1  11.9 - 14.6 % Final    Platelets 05/03/2024 243  150 - 450 K/uL Final    MPV 05/03/2024 11.9  9.4 - 12.3 FL Final    nRBC 05/03/2024 0.00  0.0 - 0.2 K/uL Final    **Note: Absolute NRBC parameter is now reported with Hemogram**    Differential Type 05/03/2024 AUTOMATED    Final    Neutrophils % 05/03/2024 46  43 - 78 % Final    Lymphocytes % 05/03/2024 45 (H)  13 - 44 % Final    Monocytes % 05/03/2024 6  4.0 - 12.0 % Final    Eosinophils % 05/03/2024 2  0.5 - 7.8 % Final    Basophils % 05/03/2024 1  0.0 - 2.0 % Final    Immature Granulocytes % 05/03/2024 0  0.0 - 5.0 % Final    Neutrophils Absolute 05/03/2024 2.3  1.7 - 8.2 K/UL Final    Lymphocytes Absolute 05/03/2024 2.2  0.5 - 4.6 K/UL Final    Monocytes Absolute 05/03/2024 0.3  0.1 - 1.3 K/UL Final

## 2024-06-20 ENCOUNTER — NURSE ONLY (OUTPATIENT)
Dept: PRIMARY CARE CLINIC | Facility: CLINIC | Age: 34
End: 2024-06-20

## 2024-06-20 DIAGNOSIS — Z13.9 SCREENING FOR UNSPECIFIED CONDITION: ICD-10-CM

## 2024-06-20 DIAGNOSIS — F90.9 ATTENTION DEFICIT HYPERACTIVITY DISORDER (ADHD), UNSPECIFIED ADHD TYPE: ICD-10-CM

## 2024-06-20 DIAGNOSIS — Z02.83 ENCOUNTER FOR DRUG SCREENING: ICD-10-CM

## 2024-06-20 DIAGNOSIS — Z79.899 ENCOUNTER FOR LONG-TERM (CURRENT) USE OF HIGH-RISK MEDICATION: ICD-10-CM

## 2024-06-20 DIAGNOSIS — Z51.81 ENCOUNTER FOR THERAPEUTIC DRUG MONITORING: ICD-10-CM

## 2024-06-26 LAB
Lab: NORMAL
Lab: NORMAL
REFERENCE LAB: NORMAL

## 2024-07-29 ENCOUNTER — LAB (OUTPATIENT)
Dept: PRIMARY CARE CLINIC | Facility: CLINIC | Age: 34
End: 2024-07-29

## 2024-07-29 DIAGNOSIS — Z20.6 EXPOSURE TO HIV: ICD-10-CM

## 2024-07-29 DIAGNOSIS — Z11.8 SCREENING FOR CHLAMYDIAL DISEASE: ICD-10-CM

## 2024-07-29 DIAGNOSIS — Z11.3 SCREENING EXAMINATION FOR VENEREAL DISEASE: ICD-10-CM

## 2024-07-29 DIAGNOSIS — Z20.2 EXPOSURE TO TRICHOMONAS: ICD-10-CM

## 2024-07-29 DIAGNOSIS — Z20.2 EXPOSURE TO GONORRHEA: ICD-10-CM

## 2024-07-29 DIAGNOSIS — Z20.828 EXPOSURE TO HERPES: ICD-10-CM

## 2024-07-29 DIAGNOSIS — Z20.5 EXPOSURE TO HEPATITIS: ICD-10-CM

## 2024-07-29 DIAGNOSIS — Z11.3 SCREENING EXAMINATION FOR SEXUALLY TRANSMITTED DISEASE: Primary | ICD-10-CM

## 2024-07-29 DIAGNOSIS — Z11.3 SCREENING EXAMINATION FOR SEXUALLY TRANSMITTED DISEASE: ICD-10-CM

## 2024-07-29 LAB
HAV IGM SER QL: NONREACTIVE
HBV CORE IGM SER QL: NONREACTIVE
HBV SURFACE AG SER QL: NONREACTIVE
HCV AB SER QL: NONREACTIVE
HIV 1+2 AB+HIV1 P24 AG SERPL QL IA: NONREACTIVE
HIV 1/2 RESULT COMMENT: NORMAL
T PALLIDUM AB SER QL IA: NONREACTIVE

## 2024-07-30 NOTE — RESULT ENCOUNTER NOTE
Please let the patient know:    HIV is negative  Syphilis is negative  Hepatitis is negative    OTHER LABS IN PROCESS    MyChart message sent as well    Explanatory note: Be assured that the information provided to create your medical record comes from your provider. The written transcription portion of this note is prepared electronically by voice-recognition software. At times, there may be some errors in capitalization, punctuation, tense, or context that are inherent in the system, but your provider reviews the note for content and to ensure that the note contains appropriate information for your continuing care.

## 2024-07-31 LAB
SPECIMEN SOURCE: NORMAL
T VAGINALIS RRNA SPEC QL NAA+PROBE: NEGATIVE

## 2024-08-01 LAB
C TRACH RRNA UR QL NAA+PROBE: NEGATIVE
M GENITALIUM DNA SPEC QL NAA+PROBE: NEGATIVE
M HOMINIS DNA SPEC QL NAA+PROBE: NEGATIVE
N GONORRHOEA RRNA UR QL NAA+PROBE: NEGATIVE
UREAPLASMA DNA SPEC QL NAA+PROBE: POSITIVE

## 2024-08-01 NOTE — PROGRESS NOTES
Negative  Negative   Final    Comment: (NOTE)  Performed At:  [facility] [id number]  Casper Green MD Ph:[phone]         Educational documents were provided including; but, not limited to diagnosis, prognosis, recurrent, complications, monitoring, instructions, prevention, etc.    Patient aware of all medications (prescribed and recommended). Patient verbalized understanding. Patient declined all other medications (OTC, provided by clinic and prescribed) as well as additional testing/imaging/diagnostics at this time. Patient aware of risks associated with declining treatment/recommendations and/or non-compliance with plan of care.    *Side effects, adverse effects, risks versus benefits associated with medications prescribed/recommended were discussed with the patient. Patient verbalized understanding. All questions answered.    *Patient was encouraged to return to the clinic and/or PCP. Or seek emergent care if worsening signs and symptoms warrant immediate evaluation including, but not limited to HA, blurred vision, facial asymmetry, speech disturbance, difficulty with ambulation/gait, numbness, tingling, weakness, syncope, chest pain (with or without radiation), left arm pain, jaw pain, changes in hearing (loss), fever, unexplained sweating, malaise/fatigue, difficulty swallowing, mental changes (confusion, AMS), lightheadedness/dizziness, difficulty breathing, or shortness of breath.    I have reviewed the patient's medication list, past medical, family, social, and surgical history in detail and updated the patient record appropriately.    I have reviewed the patient's vital signs and discussed risks associated with any abnormal vital signs (during visit and following this visit) as well as appropriate parameters with the patient. Patient verbalized understanding. Patient agreed to seek emergent care if vital signs are above or below the parameters discussed.

## 2024-08-01 NOTE — RESULT ENCOUNTER NOTE
Please let the patient know:    Testing is negative for Chlamydia, gonorrhea, trichomonas    MyChart message sent as well    Explanatory note: Be assured that the information provided to create your medical record comes from your provider. The written transcription portion of this note is prepared electronically by voice-recognition software. At times, there may be some errors in capitalization, punctuation, tense, or context that are inherent in the system, but your provider reviews the note for content and to ensure that the note contains appropriate information for your continuing care.

## 2024-08-02 LAB — HERPES SIMPLEX VIRUS 1/2 IGM: NORMAL

## 2024-08-05 ENCOUNTER — OFFICE VISIT (OUTPATIENT)
Dept: PRIMARY CARE CLINIC | Facility: CLINIC | Age: 34
End: 2024-08-05
Payer: COMMERCIAL

## 2024-08-05 VITALS
HEART RATE: 64 BPM | WEIGHT: 154 LBS | HEIGHT: 66 IN | BODY MASS INDEX: 24.75 KG/M2 | OXYGEN SATURATION: 99 % | DIASTOLIC BLOOD PRESSURE: 74 MMHG | TEMPERATURE: 98.1 F | SYSTOLIC BLOOD PRESSURE: 111 MMHG

## 2024-08-05 DIAGNOSIS — F90.9 ATTENTION DEFICIT HYPERACTIVITY DISORDER (ADHD), UNSPECIFIED ADHD TYPE: Primary | ICD-10-CM

## 2024-08-05 DIAGNOSIS — Z12.4 SCREENING FOR CERVICAL CANCER: ICD-10-CM

## 2024-08-05 DIAGNOSIS — F90.2 ATTENTION DEFICIT HYPERACTIVITY DISORDER (ADHD), COMBINED TYPE: ICD-10-CM

## 2024-08-05 DIAGNOSIS — B00.9 HSV (HERPES SIMPLEX VIRUS) INFECTION: ICD-10-CM

## 2024-08-05 PROCEDURE — 99214 OFFICE O/P EST MOD 30 MIN: CPT | Performed by: NURSE PRACTITIONER

## 2024-08-05 RX ORDER — DOXYCYCLINE HYCLATE 100 MG
100 TABLET ORAL 2 TIMES DAILY
Qty: 20 TABLET | Refills: 0 | Status: SHIPPED | OUTPATIENT
Start: 2024-08-05 | End: 2024-08-15

## 2024-08-05 RX ORDER — DEXTROAMPHETAMINE SACCHARATE, AMPHETAMINE ASPARTATE, DEXTROAMPHETAMINE SULFATE AND AMPHETAMINE SULFATE 1.25; 1.25; 1.25; 1.25 MG/1; MG/1; MG/1; MG/1
5 TABLET ORAL DAILY
Qty: 30 TABLET | Refills: 0 | Status: SHIPPED | OUTPATIENT
Start: 2024-10-05 | End: 2024-11-04

## 2024-08-05 RX ORDER — DEXTROAMPHETAMINE SACCHARATE, AMPHETAMINE ASPARTATE, DEXTROAMPHETAMINE SULFATE AND AMPHETAMINE SULFATE 1.25; 1.25; 1.25; 1.25 MG/1; MG/1; MG/1; MG/1
5 TABLET ORAL DAILY
Qty: 30 TABLET | Refills: 0 | Status: SHIPPED | OUTPATIENT
Start: 2024-08-05 | End: 2024-09-04

## 2024-08-05 RX ORDER — DEXTROAMPHETAMINE SACCHARATE, AMPHETAMINE ASPARTATE, DEXTROAMPHETAMINE SULFATE AND AMPHETAMINE SULFATE 1.25; 1.25; 1.25; 1.25 MG/1; MG/1; MG/1; MG/1
5 TABLET ORAL DAILY
Qty: 30 TABLET | Refills: 0 | Status: SHIPPED | OUTPATIENT
Start: 2024-09-05 | End: 2024-10-05

## 2024-08-05 ASSESSMENT — ENCOUNTER SYMPTOMS
DIARRHEA: 0
RESPIRATORY NEGATIVE: 1
SHORTNESS OF BREATH: 0
EYES NEGATIVE: 1
CHEST TIGHTNESS: 0
ALLERGIC/IMMUNOLOGIC NEGATIVE: 1
CONSTIPATION: 0

## 2024-08-05 NOTE — ASSESSMENT & PLAN NOTE
New diagnosis - 08/05/2024  Will discuss with previous partners  Prefers to consider medication options.  No known outbreak

## 2024-08-05 NOTE — ASSESSMENT & PLAN NOTE
On Adderall 5 mg PO QD  Referral to Psych (per request ) on 08/05/2024    CS Use  Chronic.  Stable..  CSA: UTD. 06/20/2024  UDS: UTD. 06/20/2024  Medication: See above - 90 day refill given  Discussed warning S&S r/t medication usage. Discussed SE, AR, AE.  F/u in 3 months for re-evaluation, unless an earlier f/u is required. If improvement is noted, we will provide additional refills.  Referral: Ordered today. 08/05/2024  PDMP: Checked and Reviewed at today's visit. Last Rx refill: 06/19/2024      ADD/ADHD  Plan:  Psychosocial Interventions:  Psychoeducation: Educate the patient and family about ADHD, its symptoms, and management strategies.  Behavioral Therapy: Implement behavior modification techniques to address specific symptoms, improve organization, and enhance coping skills.  Parent Training: Provide parents with strategies to manage the patient's behavior effectively at home.  Academic/Work Accommodations: Collaborate with schools or employers to implement accommodations such as extended time for tasks, preferential seating, or breaks as needed.  Pharmacological Interventions:  Medication Trial: Consider initiating pharmacotherapy based on the severity of symptoms and functional impairment. Options include stimulant medications (e.g., methylphenidate, amphetamine derivatives) or non-stimulant medications (e.g., atomoxetine, guanfacine).  Start with low doses and titrate gradually while monitoring for efficacy and side effects.  Educate the patient and family about medication effects, adherence, and potential risks.  Regular Follow-up:  Schedule regular follow-up appointments to monitor treatment response, adjust interventions as necessary, and address any concerns or challenges.  Collaborate with other healthcare providers, educators, and caregivers involved in the patient's care to ensure a comprehensive approach.  Supportive Services:  Referral to Mental Health Professional: Offer ongoing counseling or

## 2024-08-09 LAB
COLLECTION METHOD: NORMAL
CYTOLOGIST CVX/VAG CYTO: NORMAL
CYTOLOGY CVX/VAG DOC THIN PREP: NORMAL
DATE OF LMP: NORMAL
HPV APTIMA: NEGATIVE
HPV GENOTYPE REFLEX: NORMAL
Lab: NORMAL
Lab: NORMAL
PAP SOURCE: NORMAL
PATH REPORT.FINAL DX SPEC: NORMAL
STAT OF ADQ CVX/VAG CYTO-IMP: NORMAL

## 2024-08-12 NOTE — RESULT ENCOUNTER NOTE
Please let the patient know:    Recent PAP:    NEGATIVE FOR INTRAEPITHELIAL LESION OR MALIGNANCY.    MyChart message sent as well    Explanatory note: Be assured that the information provided to create your medical record comes from your provider. The written transcription portion of this note is prepared electronically by voice-recognition software. At times, there may be some errors in capitalization, punctuation, tense, or context that are inherent in the system, but your provider reviews the note for content and to ensure that the note contains appropriate information for your continuing care.

## 2024-09-16 DIAGNOSIS — F41.9 ANXIETY: Primary | ICD-10-CM

## 2024-09-16 DIAGNOSIS — F90.2 ATTENTION DEFICIT HYPERACTIVITY DISORDER (ADHD), COMBINED TYPE: ICD-10-CM

## 2024-09-30 ENCOUNTER — TELEMEDICINE (OUTPATIENT)
Dept: PRIMARY CARE CLINIC | Facility: CLINIC | Age: 34
End: 2024-09-30
Payer: COMMERCIAL

## 2024-09-30 DIAGNOSIS — B37.9 ANTIBIOTIC-INDUCED YEAST INFECTION: Primary | ICD-10-CM

## 2024-09-30 DIAGNOSIS — T36.95XA ANTIBIOTIC-INDUCED YEAST INFECTION: Primary | ICD-10-CM

## 2024-09-30 PROCEDURE — 99213 OFFICE O/P EST LOW 20 MIN: CPT | Performed by: NURSE PRACTITIONER

## 2024-09-30 RX ORDER — FLUCONAZOLE 150 MG/1
150 TABLET ORAL
Qty: 3 TABLET | Refills: 0 | Status: SHIPPED | OUTPATIENT
Start: 2024-09-30 | End: 2024-10-09

## 2024-09-30 ASSESSMENT — ENCOUNTER SYMPTOMS
DIARRHEA: 0
NAUSEA: 0
SHORTNESS OF BREATH: 0
VOMITING: 0
ALLERGIC/IMMUNOLOGIC NEGATIVE: 1
CONSTIPATION: 0
ABDOMINAL PAIN: 0
CHEST TIGHTNESS: 0
RESPIRATORY NEGATIVE: 1
EYES NEGATIVE: 1

## 2024-09-30 NOTE — PROGRESS NOTES
2024    TELEHEALTH EVALUATION -- Audio/Visual    1. The patient reports experiencing urinary tract infection (UTI) symptoms after completing a course of doxycycline for ureaplasma treatment.    2. Symptoms:  - The patient denies painful intercourse but reports recent onset of itchiness and thick white discharge, suggestive of a yeast infection.    3. Sexual History:  - The patient's sexual activity has remained unchanged, with no sexual contact for approximately three weeks.    4. Medical History:  - The patient has a history of UTI approximately five years ago, which was followed by a yeast infection after taking antibiotics.    5. Other Information:  - The patient has been drinking adequate water.  - The patient is currently working with a naturopathic nutritionist to address fluctuating hormone levels, which may be affecting her vaginal alka and predisposing her to infections.  - The patient initially thought she might need another round of antibiotics but is now considering treatment for a yeast infection.  - The patient mentions that her symptoms seemed to clear up after the initial treatment but then some lingering issues returned.    ---------  - Vital Signs:    - N/A        - Diagnostic Test Results and Labs:    - N/A          Elva Yvette (:  1990) has requested an audio/video evaluation for the following concern(s):    No chief complaint on file.      Review of Systems   Constitutional: Negative.  Negative for activity change, appetite change, fatigue and fever.   HENT: Negative.     Eyes: Negative.    Respiratory: Negative.  Negative for chest tightness and shortness of breath.    Cardiovascular:  Negative for chest pain and palpitations.   Gastrointestinal:  Negative for abdominal pain, constipation, diarrhea, nausea and vomiting.   Endocrine: Negative.    Genitourinary:  Positive for vaginal discharge.   Musculoskeletal: Negative.    Allergic/Immunologic: Negative.    Neurological:

## 2024-10-21 DIAGNOSIS — B37.9 ANTIBIOTIC-INDUCED YEAST INFECTION: Primary | ICD-10-CM

## 2024-10-21 DIAGNOSIS — T36.95XA ANTIBIOTIC-INDUCED YEAST INFECTION: Primary | ICD-10-CM

## 2024-10-21 RX ORDER — DOXYCYCLINE HYCLATE 100 MG
100 TABLET ORAL 2 TIMES DAILY
Qty: 20 TABLET | Refills: 0 | Status: SHIPPED | OUTPATIENT
Start: 2024-10-21 | End: 2024-10-31

## 2024-10-21 RX ORDER — FLUCONAZOLE 150 MG/1
150 TABLET ORAL
Qty: 3 TABLET | Refills: 0 | Status: SHIPPED | OUTPATIENT
Start: 2024-10-21 | End: 2024-10-30

## 2024-12-09 RX ORDER — BUPROPION HYDROCHLORIDE 300 MG/1
300 TABLET ORAL EVERY MORNING
Qty: 90 TABLET | Refills: 0 | Status: SHIPPED | OUTPATIENT
Start: 2024-12-09